# Patient Record
Sex: MALE | URBAN - METROPOLITAN AREA
[De-identification: names, ages, dates, MRNs, and addresses within clinical notes are randomized per-mention and may not be internally consistent; named-entity substitution may affect disease eponyms.]

---

## 2021-08-02 ENCOUNTER — HOSPITAL ENCOUNTER (INPATIENT)
Age: 41
LOS: 4 days | Discharge: HOME | DRG: 885 | End: 2021-08-06
Attending: PSYCHIATRY & NEUROLOGY | Admitting: PSYCHIATRY & NEUROLOGY
Payer: COMMERCIAL

## 2021-08-02 DIAGNOSIS — F31.63 BIPOLAR DISORDER, CURR EPISODE MIXED, SEVERE, W/O PSYCHOTIC FEATURES (HCC): Primary | ICD-10-CM

## 2021-08-02 PROCEDURE — 65220000003 HC RM SEMIPRIVATE PSYCH

## 2021-08-02 RX ORDER — BENZTROPINE MESYLATE 1 MG/1
1 TABLET ORAL
Status: DISCONTINUED | OUTPATIENT
Start: 2021-08-02 | End: 2021-08-06 | Stop reason: HOSPADM

## 2021-08-02 RX ORDER — BENZTROPINE MESYLATE 1 MG/ML
1 INJECTION INTRAMUSCULAR; INTRAVENOUS
Status: DISCONTINUED | OUTPATIENT
Start: 2021-08-02 | End: 2021-08-06 | Stop reason: HOSPADM

## 2021-08-02 RX ORDER — TRAZODONE HYDROCHLORIDE 50 MG/1
50 TABLET ORAL
Status: DISCONTINUED | OUTPATIENT
Start: 2021-08-02 | End: 2021-08-04

## 2021-08-02 RX ORDER — LORAZEPAM 2 MG/ML
2 INJECTION INTRAMUSCULAR
Status: DISCONTINUED | OUTPATIENT
Start: 2021-08-02 | End: 2021-08-06 | Stop reason: HOSPADM

## 2021-08-02 RX ORDER — HYDROXYZINE PAMOATE 50 MG/1
50 CAPSULE ORAL
Status: DISCONTINUED | OUTPATIENT
Start: 2021-08-02 | End: 2021-08-06 | Stop reason: HOSPADM

## 2021-08-02 RX ORDER — HALOPERIDOL 5 MG/ML
5 INJECTION INTRAMUSCULAR
Status: DISCONTINUED | OUTPATIENT
Start: 2021-08-02 | End: 2021-08-06 | Stop reason: HOSPADM

## 2021-08-02 RX ORDER — HALOPERIDOL 5 MG/1
5 TABLET ORAL
Status: DISCONTINUED | OUTPATIENT
Start: 2021-08-02 | End: 2021-08-06 | Stop reason: HOSPADM

## 2021-08-03 PROBLEM — F20.9 SCHIZOPHRENIA (HCC): Status: RESOLVED | Noted: 2021-08-03 | Resolved: 2021-08-03

## 2021-08-03 PROBLEM — F31.63 BIPOLAR DISORDER, CURR EPISODE MIXED, SEVERE, W/O PSYCHOTIC FEATURES (HCC): Status: ACTIVE | Noted: 2021-08-03

## 2021-08-03 PROBLEM — F20.9 SCHIZOPHRENIA (HCC): Status: ACTIVE | Noted: 2021-08-03

## 2021-08-03 PROBLEM — F12.20 CANNABIS USE DISORDER, SEVERE, DEPENDENCE (HCC): Status: ACTIVE | Noted: 2021-08-03

## 2021-08-03 PROCEDURE — 74011250637 HC RX REV CODE- 250/637: Performed by: PSYCHIATRY & NEUROLOGY

## 2021-08-03 PROCEDURE — 99222 1ST HOSP IP/OBS MODERATE 55: CPT | Performed by: PSYCHIATRY & NEUROLOGY

## 2021-08-03 PROCEDURE — 65220000003 HC RM SEMIPRIVATE PSYCH

## 2021-08-03 RX ORDER — ZIPRASIDONE HYDROCHLORIDE 20 MG/1
20 CAPSULE ORAL 2 TIMES DAILY WITH MEALS
Status: DISCONTINUED | OUTPATIENT
Start: 2021-08-03 | End: 2021-08-04

## 2021-08-03 RX ADMIN — HYDROXYZINE PAMOATE 50 MG: 50 CAPSULE ORAL at 02:31

## 2021-08-03 RX ADMIN — ZIPRASIDONE HCL 20 MG: 20 CAPSULE ORAL at 17:20

## 2021-08-03 RX ADMIN — TRAZODONE HYDROCHLORIDE 50 MG: 50 TABLET ORAL at 02:31

## 2021-08-03 NOTE — BSMART NOTE
Social Work Group Progress Note    Time: 130    Attendance:  Full     Participation Level: Engaged     Observation: Patient did participate in group and was able to disclose to peers. Patient was very verbal and appears to believe his sister is out to get him. He was able to address feelings and understand the use of positive coping skills.

## 2021-08-03 NOTE — GROUP NOTE
JASEN  GROUP DOCUMENTATION INDIVIDUAL                                                                          Group Therapy Note    Date: 8/3/2021    Group Start Time: 8443  Group End Time: 8658  Group Topic: Reflection/Relaxation    [facility] SPECIAL TRTMT 1    Adriane Yee RN    IP [address] GROUP DOCUMENTATION GROUP    Group Therapy Note    Attendees:6         Attendance: Attended    Patient's Goal:  Gratitude Reflection    Interventions/techniques: Supported    Follows Directions:  Followed directions    Interactions: Interacted appropriately    Mental Status: Flat    Behavior/appearance: Agitated and Argumentative    Goals Achieved: Able to listen to others      Additional Notes:  \"I don't want to comment\"    Jose J Cates RN

## 2021-08-03 NOTE — BH NOTES
Pt has been isolated to room since admission. Appeared irritable and ambivalent. Will continue to monitor for safety.

## 2021-08-03 NOTE — H&P
[address] [facility] [address]  Inpatient Admission Note    Date of Service:  08/03/21    Historian(s): Montez Jane and chart review  Referral Source: General acute hospital    Chief Complaint   \" I don't know. \"    History of Present Illness     Montez Jane is a 41 y.o.  male with a history of Bipolar Disorder who was admitted under TDO from [facility].  The patient is a fair historian. Medical record and TDO paperwork was reviewed. The patient says he does not know why he is here and wants to be discharged as soon as possible. He says his sister called the police while he was sitting at home and the police picked him up and took him to the hospital.  He says he has no idea why his sister called the police because he was not doing anything wrong at home. According to TDO paperwork and review of medical records, family members reported that the patient has been acting strangely. Recently he went missing for 2 days. The father reported that the patient has been making statements about harming himself and that he threatened to drive himself off a marilyn. The sister reported that the patient has been wandering off in the woods yelling and screaming and has been paranoid. He has not slept in 6 days and has not taken medications in several months. The patient denies all statements in the TDO paperwork. He denies suicidal ideations. He says he believes in God and does not believe in committing suicide. He denies ever saying that he wanted to drive himself off a marilyn. He denies having trouble with sleep. He says he sleeps an average of at least 5 hours a night and has not had any prolonged time of sleeplessness. He admits to having mood swings due to family stressors. He says \" people are constantly pushing me\". He says he is tired of his family especially his sister constantly interfering in his life.   He claims that his Cosme Peters is the one with major problems in the family\" but he is the one who ends up getting ECO and taken to the hospital.  He says he is worried about his father who is not doing well physically and is hospitalized. He says his mother passed away in March and he is afraid that his father will die soon. He is currently unemployed. He stopped working in March after his mother  but he says he has been supporting himself with the inheritance that she left for him. He denies auditory or visual hallucinations or feeling paranoid. The patient's drug screen was positive for amphetamines and marijuana. The patient vehemently denies using amphetamines or cocaine. He admits to using marijuana on a daily basis for medicating his moods. He also smokes 1 pack of cigarettes daily. He denies using alcohol in the past 2 years. Medical Review of Systems     Constitutional: No fever or chills. All other systems reviewed and are negative. Psychiatric Treatment History     The patient reports that he was diagnosed with Bipolar disorder when he was in his 25s. He reports a history of multiple psychiatric hospitalizations since the age of 19 but says last hospitalization was over 10 years ago. He has been prescribed different psychotropic medications that he cannot remember. He says the last one he was prescribed was Geodon in . He has not taken any psychotropic medications in over 10 years. He says he prefers to medicate with marijuana. He denies a history of prior suicide attempts. Allergies    No Known Allergies    Medical History     No past medical history on file. None    Medication(s)     None         Family History     No family history on file. Psychiatric Family History  Patient says he has a cousin who hanged himself. He believes his sister has bipolar disorder but she has not been diagnosed. Social History     The patient was born and raised in [place]. He is single and does not have any children. He graduated high school. He lives alone in [facility]. He says he was employed working at [employer] for several years until March 2021 after his mother passed away. He has been unemployed since then. He denies any legal history. Vitals/Labs      Vitals:    08/03/21 0851   BP: 127/71   Pulse: 64   Resp: 18   Temp: 97.3 °F (36.3 °C)       Labs: Lab work [facility] was reviewed and unremarkable except for UDS which was positive for THC and amphetamines. Patient had normal TSH of 2.1. CBC and CMP were unremarkable. No results found for this or any previous visit. Mental Status Examination     Appearance/Hygiene 41 y.o.  male  Hygiene: [address]. Patient is fairly groomed appropriately for the weather. Behavior/Social Relatedness  appropriate   Musculoskeletal Gait/Station: appropriate, steady gait  Tone (flaccid, cogwheeling, spastic): normal  Psychomotor (hyperkinetic, hypokinetic): calm  Involuntary movements (tics, dyskinesias, akathisa, stereotypies): none   Speech   Rate, rhythm, volume, fluency and articulation are appropriate   Mood   upset about being in the hospital   Affect    wide range of affect   Thought Process Linear and goal directed, tight associations       Thought Content and Perceptual Disturbances Denies delusions, ideas of reference, overvalued ideas, ruminations, obsession, compulsions, and phobias    Denies self-injurious behavior (SIB), suicidal ideation (SI), aggressive behavior or homicidal ideation (HI)    Denies auditory and visual hallucinations   Sensorium and Cognition  A&Ox4, attention intact, memory intact, language use appropriate, and fund of knowledge age appropriate   Insight  Limited   Judgment Limited       Suicide Risk Assessment     Admission  Date/Time: 08/03/21    [x] Admission  [] Discharge     The patient denies suicidal ideations or history of suicide attempts. He denies access to guns. He denies excessive use of alcohol. It appears that he has social support.   He is deemed to be at a low acute risk of suicide at this time. Main risk factor for suicide will be diagnosis of bipolar disorder. Assessment and Plan     Psychiatric Diagnoses:   Patient Active Problem List   Diagnosis Code    Bipolar disorder, curr episode mixed, severe, w/o psychotic features ([facility] [address] F31.63    Cannabis use disorder, severe, dependence [facility] F12.20       Level of impairment/disability: Mild    Tomasz Michelle is a 41 y.o. who was admitted under TDO for psychosis and questionable lyudmila. Based on the evaluation today, the patient does not present with lyudmila or psychosis. His thought process is linear and organized. He denies suicidal or homicidal ideation. He is eager for discharge. We discussed medications for bipolar disorder and the patient says he will take medication while in the hospital but once discharged he will not continue with medication because he prefers to smoke marijuana instead. 1. Admit to locked inpatient behavioral health unit. Start milieu, group, art and occupation therapy. 2. Level 1 suicide precautions with q. 15-minute checks  3. Start Geodon 20 mg twice daily with meals for diagnosis of bipolar disorder  4. Routine labs ordered and reviewed by this provider. 5. Reviewed instructions, risks, benefits and side effects. 6. Patient will call before the  tomorrow for TDO hearing. Await outcome of hearing.          Milagro Hatch MD  Psychiatrist  DR. [facility]

## 2021-08-03 NOTE — H&P
Psychiatry History and Physical    Subjective:     Date of Evaluation:  8/3/2021    Reason for Referral:  Sheri Burton was referred to the examiners from [facility] ED for psychosis. History of Presenting Problem: Sheri Burton is a 40 yo M with PMH JESSICA, schizophrenia, bipolar disorder who was admitted on TDO. He denies SI, HI, and hallucinations. Tox screen positive for amphetamines and THC. EtOH and Rapid COVID both negative. He denies any physical complaints today. *some information from printed records in chart     There are no problems to display for this patient. No past medical history on file. No past surgical history on file. No family history on file.    Social History     Tobacco Use    Smoking status: Not on file   Substance Use Topics    Alcohol use: Not on file     Prior to Admission medications    Not on File     No Known Allergies     Review of Systems - History obtained from chart review and the patient  General ROS: negative  Psychological ROS: positive for - psychosis  Ophthalmic ROS: negative  ENT ROS: negative  Respiratory ROS: negative  Cardiovascular ROS: negative  Gastrointestinal ROS: negative  Musculoskeletal ROS: negative  Neurological ROS: negative  Dermatological ROS: negative      Objective:     Patient Vitals for the past 8 hrs:   BP Temp Pulse Resp   08/03/21 0851 127/71 97.3 °F (36.3 °C) 64 18       Mental Status exam: WNL except for    Sensorium  oriented to time, place and person   Orientation situation   Relations cooperative   Eye Contact poor   Appearance:  age appropriate   Motor Behavior:  within normal limits   Speech:  normal pitch and normal volume   Vocabulary average   Thought Process: goal directed   Thought Content free of delusions and free of hallucinations   Suicidal ideations no plan  and no intention   Homicidal ideations no plan  and no intention   Mood:  stable   Affect:  stable   Memory recent  adequate   Memory remote:  adequate Concentration:  adequate   Abstraction:  concrete   Insight:  fair   Reliability fair   Judgment:  fair         Physical Exam:   Visit Vitals  /71 (BP 1 Location: Right upper arm, BP Patient Position: Sitting)   Pulse 64   Temp 97.3 °F (36.3 °C)   Resp 18     General appearance: alert, cooperative, no distress, appears stated age  Head: Normocephalic, without obvious abnormality, atraumatic  Eyes: negative  Ears: normal TM's and external ear canals AU  Nose: Nares normal. Septum midline. Mucosa normal. No drainage or sinus tenderness. Throat: Lips, mucosa, and tongue normal. Teeth and gums normal  Neck: supple, symmetrical, trachea midline and no adenopathy  Lungs: clear to auscultation bilaterally  Heart: regular rate and rhythm, S1, S2 normal, no murmur, click, rub or gallop  Abdomen: soft, non-tender. Extremities: extremities normal, atraumatic, no cyanosis or edema  Skin: Skin color, texture, turgor normal. No rashes or lesions, a few scrapes and bruises on lower extremities   Neurologic: Grossly normal        Impression:      Active Problems:    * No active hospital problems. *        Plan:     Recommendations for Treatment/Conditions:  Psychiatric treatment recommended while in hospital  Admit to inpatient psych for psychosis     Referral To:    Inpatient psychiatric care        [place], [place]   8/3/2021 11:58 AM

## 2021-08-03 NOTE — BH NOTES
Currently resting with eyes closed. Breathing even and unlabored. PRN medication appears to have been effective.

## 2021-08-03 NOTE — BH NOTES
Hermila Daley" presents as irritable, though redirectable. He currently does not want to participate in his treatment plan. He states he doesn't know why he is here, he can get any job he wants, and has a credit score of over 800. TDO process has been explained to patient. Will continue to provide support as needed. Update 1721: Upon administration of Geodon 20mg PO pt states, \"I'll take it, but once I leave I ain't replenishing it. Just [address] made it don't mean it's good, it doesn't do anything. I prefer marajuana\".

## 2021-08-03 NOTE — BSMART NOTE
Biopsychosocial Assessment    Current Level of Psychosocial Functioning     [x]Independent  []Dependent  []Minimal Assist      Comments:      Psychosocial High Risk Factors (check all that apply)      []Unable to obtain meds                                                               []Chronic illness/pain    [x]Substance abuse   []Lack of Family Support   []Financial stress   []Isolation   []Inadequate Community Resources  []Suicide attempt(s)  [x]Not taking medications   []Victim of crime   []Developmental Delay  []Unable to manage personal needs    []Age 72 or older   []  Homeless  []Mitchell transportation   []Readmission within 30 days  []Unemployment  []Traumatic Event    Psychiatric Advanced Directive: None reported by patient     Family to involve in treatment: No family to involve in treatment. Sexual Orientation:  Unknown at this time     Patient Strengths: Patient is willing to seek treatment if needed. Patient Barriers: Patient is not willing to continue with medications upon discharge     Opiate education provided: N/A    Safety plan: Patient is able to contract for safety. CMHC/MH history:  Patient reports a history of hospitalizations    Plan of Care:  medication management, group/individual therapies, family meetings, psycho -education, treatment team meetings to assist with stabilization    Initial Discharge Plan: To be determined by Rosio Stoner is a 42 yo M with PMH JESSICA, schizophrenia, bipolar disorder who was admitted on TDO. He denies SI, HI, and hallucinations. Tox screen positive for amphetamines and THC. EtOH and Rapid COVID both negative. He denies any physical complaints today. Patient reports he is being blackballed by his sister who does not want him to continue to reside in the families home.   He reports his father is ill and his sister wishes to make him look bad so he will not be allowed anything. Patient reports he has been previously hospitalized. He stated he does not wish to continue with medications and will not need any psychiatric care.     Daly Small, OSIRIS-E

## 2021-08-03 NOTE — PROGRESS NOTES
Problem: Psychosis  Goal: *STG: Remains safe in hospital  Description: AEB remaining safe and free from harm daily while hospitalized. Outcome: Progressing Towards Goal  Note: Free from injuries. Goal: *STG/LTG: Complies with medication therapy  Description: AEB taking all medication as prescribed daily while hospitalized. Outcome: Progressing Towards Goal  Note: compliant     Pt has been isolated to self. Either in day area or resting in bed. Irritable edge noted. Engages in appropriate conversation when approached. Denied SI/HI or auditory/visual hallucinations at present. Pt was encouraged to seek staff when feeling unsafe/anxious. Will continue to monitor and provide safe environment.

## 2021-08-03 NOTE — BH NOTES
Pt has been pacing from room to day area, whistling, playing games, and just talking. He was redirected for whistling while others were trying to sleep and became defensive, \"I whistle when I'm at home! \"  Pt was again to be respectful towards peers on unit trying to sleep. Irritable edge noted. Pt then showed his TDO paperwork and stated, \"I been here for 8 hours. When can I go? \"  Pt was educateded on TDO process. PRN medication was offered and accepted for signs of increasing agitation and insomnia. Pt stated, \"I was asleep for 48 hours with the police. I'm not going to sleep. \"  Encouraged pt to see if medication would work for hin and if not then reassess with MD other medications. Pt agreed with plan, but was oppositional and made indistiquishable sarcastic statements.

## 2021-08-03 NOTE — BSMART NOTE
ART THERAPY GROUP PROGRESS NOTE    Group time:1215    The patient refused group. He was passive aggressive and claimed that the \"only reason [he] is here is because[ h]e was smoking marijuana. \"

## 2021-08-03 NOTE — BH NOTES
Patient arrived on the unit via wheelchair. He was explained admission process and signed all paperwork except for property list.   Initially patient stated that, he did not want to talk but later vaguely answered some admission assessment questions. Patient appeared angry and indifferent. He stated \"I don't know why hell, I'm Here,\" My stupid family reported me missing and the police picked me up\". Patient stated that he had just gone to [place] without, Informing his family. Patient denies any thoughts of harming himself. When asked about psychiatric medication, patient stated, \"I take it when I am in the hospital, but I don't take No medications after I leave. Patient was oriented to the unit and taken to his room. Patient is monitored for safety and therapeutic support. RN'S WILL INITIAL, DEVELOP, IMPLEMENT, REVIEW OR REVISE TREATMENT PLAN.

## 2021-08-04 PROCEDURE — 74011250637 HC RX REV CODE- 250/637: Performed by: PSYCHIATRY & NEUROLOGY

## 2021-08-04 PROCEDURE — 99232 SBSQ HOSP IP/OBS MODERATE 35: CPT | Performed by: PSYCHIATRY & NEUROLOGY

## 2021-08-04 PROCEDURE — 65220000003 HC RM SEMIPRIVATE PSYCH

## 2021-08-04 RX ORDER — ZIPRASIDONE HYDROCHLORIDE 40 MG/1
40 CAPSULE ORAL 2 TIMES DAILY WITH MEALS
Status: DISCONTINUED | OUTPATIENT
Start: 2021-08-04 | End: 2021-08-06 | Stop reason: HOSPADM

## 2021-08-04 RX ORDER — TEMAZEPAM 15 MG/1
15 CAPSULE ORAL
Status: DISCONTINUED | OUTPATIENT
Start: 2021-08-04 | End: 2021-08-04

## 2021-08-04 RX ORDER — TEMAZEPAM 15 MG/1
15 CAPSULE ORAL
Status: DISCONTINUED | OUTPATIENT
Start: 2021-08-04 | End: 2021-08-06 | Stop reason: HOSPADM

## 2021-08-04 RX ADMIN — ZIPRASIDONE HCL 20 MG: 20 CAPSULE ORAL at 08:25

## 2021-08-04 RX ADMIN — HALOPERIDOL 5 MG: 5 TABLET ORAL at 01:31

## 2021-08-04 RX ADMIN — ZIPRASIDONE HCL 40 MG: 40 CAPSULE ORAL at 17:05

## 2021-08-04 RX ADMIN — TEMAZEPAM 15 MG: 15 CAPSULE ORAL at 20:15

## 2021-08-04 RX ADMIN — TRAZODONE HYDROCHLORIDE 50 MG: 50 TABLET ORAL at 01:31

## 2021-08-04 NOTE — BH NOTES
Patient has been noticeably irritable during shift as evidenced by constant pacing and muttering how he dislikes the hospital and staff under his breath. At approximately 1030 patient threatened physical violence to this writer stating \"I'm gonna knock your ass out. I don't like you. \"  Patient is rude to peers and disrespectful to everyone boasting, \"Y'all shuld have got rid of me today. By the time I'm gone y'all are gonna be wishing I wasn't here. \"  Not responsive to redirection but never escalates behavior past verbal.  Will continue to monitor.

## 2021-08-04 NOTE — BH NOTES
Pt reported feeling agitated, because he was awaken out of sleep and couldn't get back to sleep. PRN medication provided based on patient's reported symptoms. Medication appeared ineffective patient still pacing from bed to doorway. Pt was encouraged to try to relax and get some sleep will continue to monitor and support as needed.

## 2021-08-04 NOTE — BH NOTES
Patient appeared to sleep only 2 hours pacing  Standing and wasn't able to sleep even after taking medication for sleep

## 2021-08-04 NOTE — PROGRESS NOTES
[address] [facility] [address]  Inpatient Progress Note     Date of Service: 08/04/21  Hospital Day: 2     Subjective/Interval History   08/04/21    Treatment Team Notes:  Notes reviewed and/or discussed and report that Mary Mejia is a 49-year-old male with history of bipolar disorder who was admitted under TDO from [facility]. The patient went to court for TDO hearing and was involuntarily committed by the . Staff reports he only slept 2 hours last night and was pacing the milieu. He said he was irritable and made some racist comments towards the night shift staff. Patient interview: Mary Mejia was interviewed by this writer today. Today he is focused on his wallet and credit cards. He says he realized that he did not bring his wallet and credit cards with him and he fears that his sister has taken a hold of them and will spend all his money. That is all he is fixated on at this time. He says he was involuntarily committed by the court and he does not mind staying here in the hospital.  He says he will get outpatient treatment when he returns home and he is willing to take medication while in the hospital.  He denies suicidal ideations or psychotic symptoms. He is not he does not appear to be manic or psychotic but his thought process does not seem to be very logical at this time. He was advised to contact his Nury Ros and put a hold on the credit cards but he said he preferred to call his sister-in-law instead and send her to confront his sister. Objective     Visit Vitals  /83   Pulse 62   Temp 98.8 °F (37.1 °C)   Resp 16       No results found for this or any previous visit (from the past 24 hour(s)).     Mental Status Examination     Appearance/Hygiene 41 y.o.  male  Hygiene: Fair   Behavior/Social Relatedness Appropriate   Musculoskeletal Gait/Station: appropriate  Tone (flaccid, cogwheeling, spastic): not assessed  Psychomotor (hyperkinetic, hypokinetic): calm   Involuntary movements (tics, dyskinesias, akathisa, stereotypies): none   Speech   Rate, rhythm, volume, fluency and articulation are appropriate   Mood   anxious   Affect    congruent   Thought Process Linear and goal directed   Thought Content and Perceptual Disturbances Denies self-injurious behavior (SIB), suicidal ideation (SI), aggressive behavior or homicidal ideation (HI)    Denies auditory and visual hallucinations   Sensorium and Cognition  Grossly intact   Insight  Limited   Judgment  Limited        Assessment/Plan      Psychiatric Diagnoses:   Patient Active Problem List   Diagnosis Code    Bipolar disorder, curr episode mixed, severe, w/o psychotic features ([facility] F31.63    Cannabis use disorder, severe, dependence ([facility] F12.20       Level of impairment/disability: Moderate    Joby Segundo is a 41 y.o. who is currently admitted under TDO for erratic behavior. The patient has not really displayed agitation or erratic behavior on the milieu. He has been calm and withdrawn and has attended some groups. He is currently taking medications as prescribed. 1.  Increase Geodon to 40 mg twice daily for maintenance treatment of bipolar disorder. Add Restoril 15 mg at bedtime for insomnia. 2.  Reviewed instructions, risks, benefits and side effects of medications  3. Disposition/Discharge Date: self-care/home, possible discharge Friday.     Jamia Holguin MD DR. [facility]  Psychiatry

## 2021-08-04 NOTE — GROUP NOTE
JASEN  GROUP DOCUMENTATION INDIVIDUAL                                                                          Group Therapy Note    Date: 8/3/2021    Group Start Time: 2000  Group End Time: 2030  Group Topic: Medication    [address] SPECIAL TRT [place], [address] [place] [address], RN    IP [address] GROUP DOCUMENTATION GROUP    Group Therapy Note    Attendees: 5         Attendance: Attended    Patient's Goal:  Understanding medication being provided as prescribed. Interventions/techniques: Informed    Follows Directions: Followed directions    Interactions: Interacted appropriately    Mental Status: Calm    Behavior/appearance: Cooperative    Goals Achieved: Able to receive feedback      Additional Notes:  Pt has been isolated to self. He did not have any medication to be given, but we did review PRN medication. Pt denied a need for them at present. \"I will take them here, but I won't take them when I go home. \"  Will continue to monitor and support as needed.      Ofe Thompson RN

## 2021-08-04 NOTE — PROGRESS NOTES
conducted an Spirituality Group for [employer], who is a 41 y. o.,male. Patient's Primary Language is: [place]. According to the patient's EMR Pent[employer] Affiliation is: Misael Chavez The reason the Patient came to the hospital is:   Patient Active Problem List    Diagnosis Date Noted    Bipolar disorder, curr episode mixed, severe, w/o psychotic features ([facility] [address] 08/03/2021    Cannabis use disorder, severe, dependence ([facility] 08/03/2021         conducted Spirituality Group for \"[employer]\" (Ex. 52) who was one of three participants. The  provided the following Interventions:  Continued the relationship of care and support. Listened empathically. Offered prayer and assurance of continued prayer on patients behalf. Chart reviewed. The following outcomes were achieved:  Patient expressed gratitude for 's visit. Assessment:  There are no further spiritual or Nondenominational issues which require Spiritual Care Services interventions at this time. Plan:  Chaplains will continue to follow and will provide pastoral care on an as needed/requested basis.  recommends bedside caregivers page  on duty if patient shows signs of acute spiritual or emotional distress.        Kym [address]   [phone]

## 2021-08-04 NOTE — BSMART NOTE
Social Work Group Progress Note    Time:130    Attendance:  Full     Participation Level: Engaged     Observation: Patient did actively participate in group. Patient completed task at hand and was able to provide feedback to peers.

## 2021-08-04 NOTE — GROUP NOTE
JASEN  GROUP DOCUMENTATION INDIVIDUAL                                                                          Group Therapy Note    Date: 8/4/2021    Group Start Time: 4437  Group End Time: 1320  Group Topic: Nursing    SO [facility] 1 SPECIAL TRT 1    Adriane Yee RN    IP [address] GROUP DOCUMENTATION GROUP    Group Therapy Note    Attendees: 3         Attendance: Attended    Patient's Goal:  5 Senses Exercise    Interventions/techniques: Informed and Validated    Follows Directions: Followed directions    Interactions: Interacted appropriately    Mental Status: Blunted    Behavior/appearance: Negative and Resistive/oppositional    Goals Achieved: Able to self-disclose      Additional Notes:   \"This is what I have my cellphone for\"    Brennan Taylor RN

## 2021-08-04 NOTE — BH NOTES
Patient has been in the milieu periodically throughout the evening. Patient sat quietly in the milieu, did not interact with peers. Patient did attend group but did not participate in the discussion. Staff will continue to monitor patient for safety.
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
## 2021-08-05 PROCEDURE — 65220000003 HC RM SEMIPRIVATE PSYCH

## 2021-08-05 PROCEDURE — 99231 SBSQ HOSP IP/OBS SF/LOW 25: CPT | Performed by: PSYCHIATRY & NEUROLOGY

## 2021-08-05 PROCEDURE — 74011250637 HC RX REV CODE- 250/637: Performed by: PSYCHIATRY & NEUROLOGY

## 2021-08-05 RX ADMIN — ZIPRASIDONE HCL 40 MG: 40 CAPSULE ORAL at 16:28

## 2021-08-05 RX ADMIN — TEMAZEPAM 15 MG: 15 CAPSULE ORAL at 20:17

## 2021-08-05 RX ADMIN — ZIPRASIDONE HCL 40 MG: 40 CAPSULE ORAL at 08:02

## 2021-08-05 NOTE — PROGRESS NOTES
Problem: Falls - Risk of  Goal: *Absence of Falls  Description: Document Jamal Lynn Fall Risk and appropriate interventions in the flowsheet. AEB remaining free of falls and wearing skid proof socks daily while hospitalized. Outcome: Progressing Towards Goal  Note: Fall Risk Interventions:     Problem: Psychosis  Goal: *STG: Remains safe in hospital  Description: AEB remaining safe and free from harm daily while hospitalized. Outcome: Progressing Towards Goal  Goal: *STG/LTG: Complies with medication therapy  Description: AEB taking all medication as prescribed daily while hospitalized. Outcome: Progressing Towards Goal.  Pt has been social with peers during this shift. Pt is alert and oriented x 3, no irritation or any other behavior issued noted on this shift. He is diet and medication compliant. He denies SI/HI with no safety concern noted, will monitor.

## 2021-08-05 NOTE — BSMART NOTE
Social Work Group Progress Note    Time: 130    Attendance:  Full     Participation Level: Engaged     Observation: Patient did actively participate in group. Patient completed worksheet and was able to disclose.

## 2021-08-05 NOTE — BSMART NOTE
SOCIAL WORK GROUP THERAPY PROGRESS NOTE    Group Time:  9:30am    Group Topic:  Coping Skills    Group Participation:     Pt was available for group but mostly didn't participate. Somewhat disrespectful of entire process. Minimized need to be in hospital & didn't take peer comments in discussion serious. Focus was mainly on d/c after court tomorrow.

## 2021-08-05 NOTE — BH NOTES
Treatment team met -     Medical Director:                                _x____present        Psychiatrist:                                        _____present      Charge nurse:                                     _x____present           MSW:                                                _x____present      :                      _x____present      Nurse Manager:                                  _____present      Student RNs:                                      _____present      Medical Students:                               _x____present      Art Therapist:                                      _____present   Clinical Coordinator:                           _____present   Internal :                      _____present        Plan of care discussed and updated as appropriate. Patient verbalizes he felt better today. Contracts for safety and to continue with OP treatment. Plan to discharge tomorrow. SW to contact family to arrange transportation.

## 2021-08-05 NOTE — GROUP NOTE
[facility] GROUP DOCUMENTATION INDIVIDUAL                                                                          Group Therapy Note    Date: 8/4/2021    Group Start Time: 2015  Group End Time: 2030  Group Topic: Medication    SO [facility] 1 SPECIAL TRT 1    Dipika García RN    IP [address] [facility] GROUP DOCUMENTATION GROUP    Group Therapy Note    Attendees: 7         Attendance: Attended    Patient's Goal: importance of medication compliant. Interventions/techniques: Informed    Follows Directions:  Followed directions    Interactions: Interacted appropriately    Mental Status: Calm    Behavior/appearance: Cooperative    Goals Achieved: Able to listen to others      Additional Notes:      Chelsea Pierre RN

## 2021-08-05 NOTE — BSMART NOTE
ART THERAPY GROUP PROGRESS NOTE    PATIENT SCHEDULED FOR GROUP AT: 1030    ATTENDANCE: Full    PARTICIPATION LEVEL:  Participates fully in the art process    ATTENTION LEVEL : Able to focus on task    FOCUS: [address] AS NOTED IN IMAGERY: He presented with a brighter affect than noted when last seen (8/3/21) and was more willing to join group. He occasionally interacted with select peer and shared during group discussion about the importance of his spirituality.
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
## 2021-08-05 NOTE — PROGRESS NOTES
Problem: Falls - Risk of  Goal: *Absence of Falls  Description: Document Leonela Castellano Fall Risk and appropriate interventions in the flowsheet. AEB remaining free of falls and wearing skid proof socks daily while hospitalized. Outcome: Progressing Towards Goal  Note: Fall Risk Interventions:                                Problem: Psychosis  Goal: *STG/LTG: Complies with medication therapy  Description: AEB taking all medication as prescribed daily while hospitalized. Outcome: Progressing Towards Goal  Goal: *STG/LTG: Demonstrates improved social functioning by responding appropriately to staff  Description: Romelia Field demonstrating improved social functioning by responding appropriately to staff each shift daily while hospitalized. Outcome: Progressing Towards Goal  Patient sitting out in day area this morning. He was playing cards with his peer. He approached nurse and ask to dial a number for him. Redirected pt. to another staff due to nurse passing morning medication. He responded \" I don't want that asshole to call anyone for me\". Patient is selected to whom he is sarcastic with. Will continue to provide support, set limits and offer emotional support as needed. He denies suicidal thoughts. Attend treatment team today, informed of possible discharge tomorrow.

## 2021-08-05 NOTE — BSMART NOTE
Clinical Summary:Everett Pedro is a 40 yo M with PMH JESSICA, schizophrenia, bipolar disorder who was admitted on TDO. He denies SI, HI, and hallucinations. Tox screen positive for amphetamines and THC. EtOH and Rapid COVID both negative. He denies any physical complaints today. GEORGE Assessment/Intervention:  SW made contact with patient as he attended Team Treatment meeting. Patient is polite and able to address concerns regarding treatment. He reports family stressors regarding his relationship with his sister and feels this will never resolve. Patient reports he will continue treatment and will comply with medications. Team recommended outpatient therapy and medication management to refrain from future hospitalizations. Patient denies SI/HI. Patient denies AVH. SW Collateral:  SW made contact with sister in law Chavis Plate [phone]) to address discharge plan. She reports her  is patients brother and they have decided to allow patient to reside [place] their [place]. She reports she will assist with patients care and ensure he attends his follow up care. She reports they will also provide transportation once patient is discharged.      NYA CohenE

## 2021-08-06 VITALS
DIASTOLIC BLOOD PRESSURE: 89 MMHG | HEART RATE: 57 BPM | SYSTOLIC BLOOD PRESSURE: 136 MMHG | TEMPERATURE: 97 F | RESPIRATION RATE: 18 BRPM

## 2021-08-06 LAB
ATRIAL RATE: 65 BPM
CALCULATED P AXIS, ECG09: 56 DEGREES
CALCULATED R AXIS, ECG10: 50 DEGREES
CALCULATED T AXIS, ECG11: 56 DEGREES
DIAGNOSIS, 93000: NORMAL
P-R INTERVAL, ECG05: 128 MS
Q-T INTERVAL, ECG07: 384 MS
QRS DURATION, ECG06: 88 MS
QTC CALCULATION (BEZET), ECG08: 399 MS
VENTRICULAR RATE, ECG03: 65 BPM

## 2021-08-06 PROCEDURE — 74011250637 HC RX REV CODE- 250/637: Performed by: PSYCHIATRY & NEUROLOGY

## 2021-08-06 PROCEDURE — 99239 HOSP IP/OBS DSCHRG MGMT >30: CPT | Performed by: PSYCHIATRY & NEUROLOGY

## 2021-08-06 PROCEDURE — 93005 ELECTROCARDIOGRAM TRACING: CPT

## 2021-08-06 RX ORDER — TEMAZEPAM 15 MG/1
15 CAPSULE ORAL
Qty: 15 CAPSULE | Refills: 0 | Status: SHIPPED | OUTPATIENT
Start: 2021-08-06

## 2021-08-06 RX ORDER — ZIPRASIDONE HYDROCHLORIDE 40 MG/1
40 CAPSULE ORAL 2 TIMES DAILY WITH MEALS
Qty: 60 CAPSULE | Refills: 0 | Status: SHIPPED | OUTPATIENT
Start: 2021-08-06

## 2021-08-06 RX ORDER — ZIPRASIDONE HYDROCHLORIDE 40 MG/1
40 CAPSULE ORAL 2 TIMES DAILY WITH MEALS
Qty: 60 CAPSULE | Refills: 0 | Status: SHIPPED | OUTPATIENT
Start: 2021-08-06 | End: 2021-08-06

## 2021-08-06 RX ADMIN — HYDROXYZINE PAMOATE 50 MG: 50 CAPSULE ORAL at 00:14

## 2021-08-06 RX ADMIN — ZIPRASIDONE HCL 40 MG: 40 CAPSULE ORAL at 08:11

## 2021-08-06 NOTE — BH NOTES
Patient presented himself in good affect with congruent mood. Patient spent the majority of this shift out in the milieu interacting with staff and patients. Patient played a number of card games with others, and watched TV throughout the day. Patient was polite and appropriate in all observed encounters. No issues to report. Patient has been cooperative with staff and compliant with treatment recommendations made. Staff will continue to monitor patient for any change in mood and behavior.

## 2021-08-06 NOTE — DISCHARGE SUMMARY
[facility]  Inpatient Psychiatry   Discharge Summary     Admit date: 8/2/2021    Discharge date and time: 8/6/2021    Discharge Physician: Avel Bumpers, MD    DISCHARGE DIAGNOSES     Psychiatric Diagnoses:   Patient Active Problem List   Diagnosis Code    Bipolar disorder, curr episode mixed, severe, w/o psychotic features ([facility] F31.63    Cannabis use disorder, severe, dependence ([facility].) F12.20       Level of impairment/disability:  [address] Chris Witt is a 41 y.o.  male with a history of Bipolar Disorder who was admitted under TDO from [facility].  The patient is a fair historian. Medical record and TDO paperwork was reviewed. The patient says he does not know why he is here and wants to be discharged as soon as possible. He says his sister called the police while he was sitting at home and the police picked him up and took him to the hospital.  He says he has no idea why his sister called the police because he was not doing anything wrong at home. According to TDO paperwork and review of medical records, family members reported that the patient has been acting strangely. Recently he went missing for 2 days. The father reported that the patient has been making statements about harming himself and that he threatened to drive himself off a marilyn. The sister reported that the patient has been wandering off in the woods yelling and screaming and has been paranoid. He has not slept in 6 days and has not taken medications in several months.     The patient denies all statements in the TDO paperwork. He denies suicidal ideations. He says he believes in God and does not believe in committing suicide. He denies ever saying that he wanted to drive himself off a marilyn. He denies having trouble with sleep. He says he sleeps an average of at least 5 hours a night and has not had any prolonged time of sleeplessness.   He admits to having mood swings due to family stressors. He says \" people are constantly pushing me\". He says he is tired of his family especially his sister constantly interfering in his life. He claims that his Bennett Bradshaw is the one with major problems in the family\" but he is the one who ends up getting ECO and taken to the hospital.  He says he is worried about his father who is not doing well physically and is hospitalized. He says his mother passed away in March and he is afraid that his father will die soon. He is currently unemployed. He stopped working in March after his mother  but he says he has been supporting himself with the inheritance that she left for him. He denies auditory or visual hallucinations or feeling paranoid.     The patient's drug screen was positive for amphetamines and marijuana. The patient vehemently denies using amphetamines or cocaine. He admits to using marijuana on a daily basis for medicating his moods. He also smokes 1 pack of cigarettes daily. He denies using alcohol in the past 2 years. Hospital course: The patient was admitted and monitored for lyudmila, psychosis and suicidal ideation. He received individual, group and medication therapy. He was involuntarily committed by the courts after TDO hearing. He was prescribed Geodon which was titrated to 40 mg twice a day with meals for treatment of bipolar disorder. The patient did not present with any acute manic or psychotic symptoms and he consistently denied suicidal ideations. Nevertheless he had some irritability which significantly improved with medication and by time of discharge. He was compliant with the treatment plan and took medications as ordered. He also attended groups with good participation. He did not engage in any self-injurious behavior while on the unit and he was not physically aggressive. He did not require seclusion or restraint. The patient tolerated Geodon well.   Initially, he said that he will not take the medication when he is discharged but as hospitalization progressed, he said he felt that he was thinking clearer and that his mood was better with the medication so he plans to continue taking it since he is not experiencing any adverse reaction or sedation from it. Education was provided on his diagnosis and treatment and the patient plans to continue outpatient treatment when he returns to his home town. The patient was encouraged to engage in outpatient treatment especially individual psychotherapy to help cope with the recent loss of his mother and other family stressors ongoing. The patient reported that his father was physically ill and hospitalized and he was uncertain the father was going to survive. No SI, HI, psychosis or lyudmila at time of discharge. His brother will be coming to provide transportation back home. The patient is deemed to be at a low acute risk of suicide at this time. He denies a history of prior suicide attempts. He denies suicidal ideation. His mood is euthymic. He denies access to guns. He denies use of alcohol. DISPOSITION/FOLLOW-UP     Disposition: Home    Follow-up Appointments: Follow-up Information     Follow up With Specialties Details Why Contact Info        Patient has an appointment with   [address] [place] [address] on 8/12/21 @ 10:00am  Top Floor Admissions  [address]. [address] [place] [address]  [phone]:[facility]  [phone]:fax                MEDICATION CHANGES   Outpatient medications:  No current facility-administered medications on file prior to encounter. No current outpatient medications on file prior to encounter.          Medications discontinued during hospitalization:  Medications Discontinued During This Encounter   Medication Reason    ziprasidone (GEODON) capsule 20 mg     traZODone (DESYREL) tablet 50 mg     temazepam (RESTORIL) capsule 15 mg     ziprasidone (GEODON) 40 mg capsule          Discharged medication:  Current Discharge Medication List START taking these medications    Details   ziprasidone (GEODON) 40 mg capsule Take 1 Capsule by mouth two (2) times daily (with meals). Indications: lyudmila associated with bipolar disorder  Qty: 60 Capsule, Refills: 0  Start date: 8/6/2021      temazepam (RESTORIL) 15 mg capsule Take 1 Capsule by mouth nightly as needed for Sleep. Max Daily Amount: 15 mg. Indications: difficulty sleeping  Qty: 15 Capsule, Refills: 0  Start date: 8/6/2021    Associated Diagnoses: Bipolar disorder, curr episode mixed, severe, w/o psychotic features (H[facility])             Instructions, risks (black box warning), benefits and side effects (EPS, TD, NMS) were discussed in detail prior to discharge. Patient denied any adverse medication side effects prior to discharge. LABS/IMAGING DURING ADMISSION     No results found for this or any previous visit. DISCHARGE MENTAL STATUS EVALUATION     Appearance  good hygiene, well groomed   Attitude/Behavior/Social Relatedness Appropriate   Musculoskeletal Gait/Station: appropriate, steady  Psychomotor (hyperkinetic, hypokinetic): calm  Involuntary movements (tics, dyskinesias, akathisa, stereotypies): none   Speech   Rate, rhythm, volume, fluency and articulation are appropriate   Mood   euthymic   Affect    congruent   Thought Process Linear and goal directed   Thought Content and Perceptual Disturbances Denies self-injurious behavior (SIB), suicidal ideation (SI), aggressive behavior or homicidal ideation (HI)    Denies auditory and visual hallucinations   Sensorium and Cognition  Grossly intact   Insight  fair   Judgment  fair       SUICIDE RISK ASSESSMENT     [] Admission  [x] Discharge     Key Factors:   Current admission precipitated by suicide attempt?   []  Yes     2    [x]  No     1     Suicide Attempt History  [] Past attempts of high lethality    2 []  Past attempts of low lethality    1 [x]  No previous attempts       0   Suicidal Ideation []  Constant suicidal thoughts      2 [] Intermittent or fleeting suicidal  thoughts  1 [x]  Denies current suicidal thoughts    0   Suicide Plan   []  Has plan with actual OR potential access to planned method    2 []  Has plan without access to planned method      1 [x]  No plan            0   Plan Lethality []  Highly lethal plan (Carbon monoxide, gun, hanging, jumping)    2 []  Moderate lethality of plan          1 []  Low lethality of plan (biting, head banging, superficial scratching, pillow over face)  0   Safety Plan Agreement  []  Unwilling OR unable to agree due to impaired reality testing   2   []  Patient is ambivalent and/or guarded      1 [x]  Reliably agrees        0   Current Morbid Thoughts (reunion fantasies, preoccupations with death) []  Constantly     2     []  Frequently    1 [x]  Rarely    0   Elopement Risk  []  High risk     2 []  Moderate risk    1 [x]   Low risk    0   Symptoms    []  Hopeless  []  Helpless  []  Anhedonia   []  Guilt/shame  []  Anger/rage  []  Anxiety  []  Insomnia   []  Agitation   []  Impulsivity  []  5-6 symptoms present    2 []  3-4 symptoms present    1  [x]  0-2 symptoms present    0     Scoring Key:  10 or higher = Imminent Risk (consider 1:1)  4 - 9 = Moderate Risk (consider q 15 minute observation)Attended alcohol, tobacco, prescription and other drug psychoeducation group.   0 - 3 = Low Risk (consider q 30 minute observation)    Total Score: 1  ------------------------------------------------------------------------------------------------------------------  PLEASE ADDRESS THE FOLLOWING 5 ISSUES     Physician's Subjective Appraisal of Risk (check one):  []  Patient replies not trustworthy: several non-verbal cues. []  Patient replies questionable: trustworthy: at least 1 non-verbal cue. [x]  Patient replies appear trustworthy. Family History of Suicide?    []  Yes  [x]  No    Protective measures (select all that apply):  [x]  Successful past responses to stress  []  Spiritual/Evangelical beliefs  [x] Capacity for reality testing  []  Positive therapeutic relationships  [x]  Social supports/connections  [x]  Positive coping skills  []  Frustration tolerance/optimism  []  Children or pets in the home  []  Sense of responsibility to family  [x]  Agrees to treatment plan and follow up    Others (list):    High Risk Diagnoses (select all that apply):  [x]  Depression/Bipolar Disorder  [x]  Dual Diagnosis  []  Cardiovascular Disease  []  Schizophrenia  []  Chronic Pain  []  Epilepsy  []  Cancer  []  Personality Disorder  []  HIV/AIDS  []  Multiple Sclerosis    Dangerousness Assessment (Suicide, homicide, property destruction. ..)    Risk Factors reviewed and risk assessed to be:  [x] low  [] low-moderate  [] moderate   [] moderate-high  [] high     Protection factors reviewed and risk assessed to be:  [x] low  [] low-moderate  [] moderate   [] moderate-high  [] high     Response to treatment and risk assessed to be:  [x] low  [] low-moderate  [] moderate   [] moderate-high  [] high     Support reviewed and risk assessed to be:  [x] low  [] low-moderate  [] moderate   [] moderate-high  [] high     Acceptance of Discharge and outpatient treatment reviewed and risk assessed to be:    [x] low  [] low-moderate  [] moderate   [] moderate-high  [] high   Overall risk assessed to be:  [x] low  [] low-moderate  [] moderate   [] moderate-high  [] high     Completion of discharge was greater than 30 minutes. Over 50% of today's discharge was geared towards counseling and coordination of care.           Humberto Law MD  Psychiatry  [facility]

## 2021-08-06 NOTE — GROUP NOTE
JASEN  GROUP DOCUMENTATION INDIVIDUAL                                                                          Group Therapy Note    Date: 8/5/2021    Group Start Time: 2000  Group End Time: 2030  Group Topic: Medication    [facility] 1 SPECIAL TRTMT 1    [facility] GROUP DOCUMENTATION GROUP    Group Therapy Note    Attendees: 4         Attendance: Attended    Patient's Goal:  Comprehend the importance of medication compliance    Interventions/techniques: Informed    Follows Directions:  Followed directions    Interactions: Interacted appropriately    Mental Status: Calm    Behavior/appearance: Attentive    Goals Achieved: Able to listen to others      Additional Notes:  Pt took hs medications    Mikel Cartwright

## 2021-08-06 NOTE — GROUP NOTE
JASEN  GROUP DOCUMENTATION INDIVIDUAL                                                                          Group Therapy Note    Date: 8/6/2021    Group Start Time: 1200  Group End Time: 3323  Group Topic: Nursing    [facility] 1 SPECIAL TRTMT Joslyn Linder, RN    IP [address] [facility] GROUP DOCUMENTATION GROUP    Group Therapy Note    Attendees: 2         Attendance: Did not attend              Gabe Hartman RN

## 2021-08-06 NOTE — DISCHARGE INSTRUCTIONS
BEHAVIORAL HEALTH NURSING DISCHARGE NOTE      The following personal items collected during your admission are returned to you:   Dental Appliance: Dental Appliances: None  Vision:    Hearing Aid:    Jewelry: Jewelry: None  Clothing: Clothing: Hat, Shirt, Shorts, Footwear, Pants, With patient (all clothing with patient except hat)  Other Valuables: Other Valuables: Cell Phone, Cigarettes, Other (comment) (Cell phone and cigarettes in pt locker. )  Valuables sent to safe: Personal Items Sent to Safe: none      PATIENT INSTRUCTIONS:           The discharge information has been reviewed with the patient. The patient verbalized understanding.         Patient armband removed and shredded